# Patient Record
Sex: FEMALE | Race: BLACK OR AFRICAN AMERICAN | NOT HISPANIC OR LATINO | URBAN - METROPOLITAN AREA
[De-identification: names, ages, dates, MRNs, and addresses within clinical notes are randomized per-mention and may not be internally consistent; named-entity substitution may affect disease eponyms.]

---

## 2021-04-29 PROBLEM — 428283002 HISTORY OF POLYP OF COLON: Status: ACTIVE | Noted: 2021-04-29

## 2021-08-17 ENCOUNTER — OFFICE VISIT (OUTPATIENT)
Dept: URBAN - METROPOLITAN AREA SURGERY CENTER 16 | Facility: SURGERY CENTER | Age: 61
End: 2021-08-17
Payer: COMMERCIAL

## 2021-08-17 DIAGNOSIS — Z86.010 H/O ADENOMATOUS POLYP OF COLON: ICD-10-CM

## 2021-08-17 PROCEDURE — G8907 PT DOC NO EVENTS ON DISCHARG: HCPCS | Performed by: INTERNAL MEDICINE

## 2021-08-17 PROCEDURE — 45378 DIAGNOSTIC COLONOSCOPY: CPT | Performed by: INTERNAL MEDICINE

## 2021-10-07 ENCOUNTER — DASHBOARD ENCOUNTERS (OUTPATIENT)
Age: 61
End: 2021-10-07

## 2021-10-11 ENCOUNTER — OFFICE VISIT (OUTPATIENT)
Dept: URBAN - METROPOLITAN AREA TELEHEALTH 2 | Facility: TELEHEALTH | Age: 61
End: 2021-10-11
Payer: COMMERCIAL

## 2021-10-11 DIAGNOSIS — E55.9 VITAMIN D DEFICIENCY DISEASE: ICD-10-CM

## 2021-10-11 DIAGNOSIS — K21.00 GASTROESOPHAGEAL REFLUX DISEASE WITH ESOPHAGITIS WITHOUT HEMORRHAGE: ICD-10-CM

## 2021-10-11 DIAGNOSIS — K58.0 IBS-D: ICD-10-CM

## 2021-10-11 PROBLEM — 34713006: Status: ACTIVE | Noted: 2021-10-11

## 2021-10-11 PROBLEM — 10743008: Status: ACTIVE | Noted: 2021-10-11

## 2021-10-11 PROBLEM — 266433003: Status: ACTIVE | Noted: 2021-10-11

## 2021-10-11 PROCEDURE — 99213 OFFICE O/P EST LOW 20 MIN: CPT | Performed by: INTERNAL MEDICINE

## 2021-10-11 NOTE — HPI-TODAY'S VISIT:
The pt has a history of IBS with constipation who presents for a f/u office visit. She notes that she is doing well and she has is now eating more vegetables. She denies melena, hematemesis or hematochezia. She is s/p colon in 9/2021 which was normal. She is exercising and is doing well.   The patient's consultation note will be sent to the  referring MD, Dr. Tyler Pascual.

## 2025-04-03 ENCOUNTER — OFFICE VISIT (OUTPATIENT)
Dept: URBAN - METROPOLITAN AREA CLINIC 17 | Facility: CLINIC | Age: 65
End: 2025-04-03

## 2025-04-03 ENCOUNTER — TELEPHONE ENCOUNTER (OUTPATIENT)
Dept: URBAN - METROPOLITAN AREA CLINIC 6 | Facility: CLINIC | Age: 65
End: 2025-04-03

## 2025-04-03 NOTE — HPI-TODAY'S VISIT:
2021 Dr. Perry The pt has a history of IBS with constipation who presents for a f/u office visit. She notes that she is doing well and she has is now eating more vegetables. She denies melena, hematemesis or hematochezia. She is s/p colon in 9/2021 which was normal. She is exercising and is doing well. The patient's consultation note will be sent to the  referring MD, Dr. Tyler Pascual.  04/03/25